# Patient Record
Sex: MALE | URBAN - METROPOLITAN AREA
[De-identification: names, ages, dates, MRNs, and addresses within clinical notes are randomized per-mention and may not be internally consistent; named-entity substitution may affect disease eponyms.]

---

## 2023-08-18 ENCOUNTER — APPOINTMENT (OUTPATIENT)
Dept: URBAN - METROPOLITAN AREA CLINIC 216 | Age: 78
Setting detail: DERMATOLOGY
End: 2023-08-22

## 2023-08-18 PROBLEM — C44.399 OTHER SPECIFIED MALIGNANT NEOPLASM OF SKIN OF OTHER PARTS OF FACE: Status: ACTIVE | Noted: 2023-08-18

## 2023-08-18 PROCEDURE — 12052 INTMD RPR FACE/MM 2.6-5.0 CM: CPT

## 2023-08-18 PROCEDURE — OTHER MOHS SURGERY: OTHER

## 2023-08-18 PROCEDURE — 17311 MOHS 1 STAGE H/N/HF/G: CPT

## 2023-08-18 PROCEDURE — OTHER TREATMENT REGIMEN: OTHER

## 2023-11-21 NOTE — PROCEDURE: MOHS SURGERY
21-Nov-2023 18:50:41 Non-Graft Cartilage Fenestration Text: The cartilage was fenestrated with a 2mm punch biopsy to help facilitate healing.

## 2025-01-08 NOTE — PROCEDURE: MOHS SURGERY
Tumor Debulked?: scalpel IBW: 118 lbs  Weight hx in EMR: 138.9 lbs (3/28/24); 122 lbs (6/18/24); 124 lbs (10/3/24); 125 lbs (12/3/24)